# Patient Record
Sex: FEMALE | Race: WHITE | URBAN - METROPOLITAN AREA
[De-identification: names, ages, dates, MRNs, and addresses within clinical notes are randomized per-mention and may not be internally consistent; named-entity substitution may affect disease eponyms.]

---

## 2018-10-24 ENCOUNTER — OFFICE VISIT (OUTPATIENT)
Dept: SURGERY | Age: 33
End: 2018-10-24

## 2018-10-24 VITALS
SYSTOLIC BLOOD PRESSURE: 116 MMHG | DIASTOLIC BLOOD PRESSURE: 74 MMHG | HEIGHT: 63 IN | BODY MASS INDEX: 24.8 KG/M2 | WEIGHT: 140 LBS | HEART RATE: 87 BPM

## 2018-10-24 DIAGNOSIS — N60.11 FIBROCYSTIC BREAST, RIGHT: ICD-10-CM

## 2018-10-24 DIAGNOSIS — N60.01 BREAST CYST, RIGHT: Primary | ICD-10-CM

## 2018-10-24 NOTE — COMMUNICATION BODY
HISTORY OF PRESENT ILLNESS  Ulices Michelle is a 26 y.o. female. HPI   NEW patient referred by Dr. Alon Lewis for RIGHT breast pain and RIGHT breast lumps, noticed about 2 weeks ago. Describes >1 lump palpable in RIGHT lateral breast tissue, with pain 8/10 when palpated, otherwise no pain. Denies any nipple changes or drainage.     Obstetric History     No data available   Obstetric Comments   Menarche 14, LMP 10/16/18, # of children 1, age of 4st delivery 20, Hysterectomy/oophorectomy NO/NO, Breast bx NO, history of breast feeding YES, BCP NO, Hormone therapy NO      Family history:  Maternal aunt diagnosed with breast cancer mid 42's, now in her late 52's with bone mets.     No previous breast imaging. History reviewed. No pertinent past medical history. History reviewed. No pertinent surgical history. Social History     Socioeconomic History    Marital status: SINGLE     Spouse name: Not on file    Number of children: Not on file    Years of education: Not on file    Highest education level: Not on file   Social Needs    Financial resource strain: Not on file    Food insecurity - worry: Not on file    Food insecurity - inability: Not on file    Transportation needs - medical: Not on file   [employer] needs - non-medical: Not on file   Occupational History    Not on file   Tobacco Use    Smoking status: Never Smoker    Smokeless tobacco: Never Used   Substance and Sexual Activity    Alcohol use: Yes     Frequency: Monthly or less    Drug use: No    Sexual activity: Not on file   Other Topics Concern    Not on file   Social History Narrative    Not on file       No current outpatient medications on file prior to visit. No current facility-administered medications on file prior to visit.         Allergies   Allergen Reactions    Other Plant, Animal, Environmental Cough     UR symptoms, mold, seasonal allergy    Pcn [Penicillins] Other (comments)     Childhood reaction, unknown       OB History     Obstetric Comments    Menarche 14, LMP 10/16/18, # of children 1, age of 4st delivery 20, Hysterectomy/oophorectomy NO/NO, Breast bx NO, history of breast feeding YES, BCP NO, Hormone therapy NO        ROS  Constitutional: Negative. HENT: Positive for sinus pain. Eyes: Positive for blurred vision. Occasional blurry vision and dizziness   Respiratory: Negative. Cardiovascular: Negative. Gastrointestinal: Negative. Genitourinary: Negative. Musculoskeletal: Positive for joint pain. Skin: Negative. Neurological: Positive for headaches. Endo/Heme/Allergies: Negative. Psychiatric/Behavioral: The patient is nervous/anxious. Physical Exam   Cardiovascular: Normal rate, regular rhythm and normal heart sounds. Pulmonary/Chest: Breath sounds normal. Right breast exhibits tenderness. Right breast exhibits no inverted nipple, no mass, no nipple discharge and no skin change. Left breast exhibits no inverted nipple, no mass, no nipple discharge, no skin change and no tenderness. Breasts are symmetrical.       Lymphadenopathy:        Right cervical: No superficial cervical, no deep cervical and no posterior cervical adenopathy present. Left cervical: No superficial cervical, no deep cervical and no posterior cervical adenopathy present. She has no axillary adenopathy. Right axillary: No pectoral and no lateral adenopathy present. Left axillary: No pectoral and no lateral adenopathy present. BREAST ULTRASOUND  Indication: right breast pain UOQ  Technique: The area was scanned using a high-frequency linear-array near-field transducer  Findings: Cysts, dense breast tissue  Impression: Benign cysts, normal dense breast tissue  Disposition: Vit E and Primrose oil for pain    ASSESSMENT and PLAN    ICD-10-CM ICD-9-CM    1. Breast cyst, right N60.01 610.0    2.  Fibrocystic breast, right N60.11 610.1       New patient presents for evaluation of RIGHT breast pain. RIGHT breast UOQ tender to palpation on exam. US visualizes cysts and normal dense breast tissue. Pt notes changes to her menstrual periods, possibly due to stress. Reviewed that these changes can often cause breast pain. Advised her to continue observation, and if pain does not resolve, treat with Vitamin E and Primrose oil. If this does not help, f/u for further evaluation. F/U PRN. This plan was reviewed with the patient and patient agrees. All questions were answered.     Written by Antonio Wooten, as dictated by Dr. Wilfrid Palomo MD.

## 2018-10-24 NOTE — PROGRESS NOTES
HISTORY OF PRESENT ILLNESS  Fatou Spivey is a 26 y.o. female. HPI   NEW patient referred by Dr. Gareth Saez for RIGHT breast pain and RIGHT breast lumps, noticed about 2 weeks ago. Describes >1 lump palpable in RIGHT lateral breast tissue, with pain 8/10 when palpated, otherwise no pain. Denies any nipple changes or drainage.     Obstetric History     No data available   Obstetric Comments   Menarche 14, LMP 10/16/18, # of children 1, age of 4st delivery 20, Hysterectomy/oophorectomy NO/NO, Breast bx NO, history of breast feeding YES, BCP NO, Hormone therapy NO      Family history:  Maternal aunt diagnosed with breast cancer mid 42's, now in her late 52's with bone mets.     No previous breast imaging. History reviewed. No pertinent past medical history. History reviewed. No pertinent surgical history. Social History     Socioeconomic History    Marital status: SINGLE     Spouse name: Not on file    Number of children: Not on file    Years of education: Not on file    Highest education level: Not on file   Social Needs    Financial resource strain: Not on file    Food insecurity - worry: Not on file    Food insecurity - inability: Not on file    Transportation needs - medical: Not on file   [employer] needs - non-medical: Not on file   Occupational History    Not on file   Tobacco Use    Smoking status: Never Smoker    Smokeless tobacco: Never Used   Substance and Sexual Activity    Alcohol use: Yes     Frequency: Monthly or less    Drug use: No    Sexual activity: Not on file   Other Topics Concern    Not on file   Social History Narrative    Not on file       No current outpatient medications on file prior to visit. No current facility-administered medications on file prior to visit.         Allergies   Allergen Reactions    Other Plant, Animal, Environmental Cough     UR symptoms, mold, seasonal allergy    Pcn [Penicillins] Other (comments)     Childhood reaction, unknown       OB History     Obstetric Comments    Menarche 14, LMP 10/16/18, # of children 1, age of 4st delivery 20, Hysterectomy/oophorectomy NO/NO, Breast bx NO, history of breast feeding YES, BCP NO, Hormone therapy NO        ROS  Constitutional: Negative. HENT: Positive for sinus pain. Eyes: Positive for blurred vision. Occasional blurry vision and dizziness   Respiratory: Negative. Cardiovascular: Negative. Gastrointestinal: Negative. Genitourinary: Negative. Musculoskeletal: Positive for joint pain. Skin: Negative. Neurological: Positive for headaches. Endo/Heme/Allergies: Negative. Psychiatric/Behavioral: The patient is nervous/anxious. Physical Exam   Cardiovascular: Normal rate, regular rhythm and normal heart sounds. Pulmonary/Chest: Breath sounds normal. Right breast exhibits tenderness. Right breast exhibits no inverted nipple, no mass, no nipple discharge and no skin change. Left breast exhibits no inverted nipple, no mass, no nipple discharge, no skin change and no tenderness. Breasts are symmetrical.       Lymphadenopathy:        Right cervical: No superficial cervical, no deep cervical and no posterior cervical adenopathy present. Left cervical: No superficial cervical, no deep cervical and no posterior cervical adenopathy present. She has no axillary adenopathy. Right axillary: No pectoral and no lateral adenopathy present. Left axillary: No pectoral and no lateral adenopathy present. BREAST ULTRASOUND  Indication: right breast pain UOQ  Technique: The area was scanned using a high-frequency linear-array near-field transducer  Findings: Cysts, dense breast tissue  Impression: Benign cysts, normal dense breast tissue  Disposition: Vit E and Primrose oil for pain    ASSESSMENT and PLAN    ICD-10-CM ICD-9-CM    1. Breast cyst, right N60.01 610.0    2.  Fibrocystic breast, right N60.11 610.1       New patient presents for evaluation of RIGHT breast pain. RIGHT breast UOQ tender to palpation on exam. US visualizes cysts and normal dense breast tissue. Pt notes changes to her menstrual periods, possibly due to stress. Reviewed that these changes can often cause breast pain. Advised her to continue observation, and if pain does not resolve, treat with Vitamin E and Primrose oil. If this does not help, f/u for further evaluation. F/U PRN. This plan was reviewed with the patient and patient agrees. All questions were answered.     Written by Erika Ambrose, as dictated by Dr. Alecia Michele MD.

## 2018-10-24 NOTE — PATIENT INSTRUCTIONS

## 2018-10-24 NOTE — PROGRESS NOTES
HISTORY OF PRESENT ILLNESS Ramya Owens is a 26 y.o. female. HPI  NEW patient referred by Dr. Ana Laura Parker for RIGHT breast pain and RIGHT breast lumps, noticed about 2 weeks ago. Describes >1 lump palpable in RIGHT lateral breast tissue, with pain 8/10 when palpated, otherwise no pain. Denies any nipple changes or drainage. Obstetric History No data available Obstetric Comments Menarche 14, LMP 10/16/18, # of children 1, age of 4st delivery 20, Hysterectomy/oophorectomy NO/NO, Breast bx NO, history of breast feeding YES, BCP NO, Hormone therapy NO Family history: 
Maternal aunt diagnosed with breast cancer mid 42's, now in her late 52's with bone mets. No previous breast imaging. Review of Systems Constitutional: Negative. HENT: Positive for sinus pain. Eyes: Positive for blurred vision. Occasional blurry vision and dizziness Respiratory: Negative. Cardiovascular: Negative. Gastrointestinal: Negative. Genitourinary: Negative. Musculoskeletal: Positive for joint pain. Skin: Negative. Neurological: Positive for headaches. Endo/Heme/Allergies: Negative. Psychiatric/Behavioral: The patient is nervous/anxious. Physical Exam 
 
ASSESSMENT and PLAN 
{ASSESSMENT/PLAN:81903}

## 2018-10-24 NOTE — LETTER
10/24/2018 12:12 PM 
 
Patient:  Lolita Burger YOB: 1985 Date of Visit: 10/24/2018 Dear Rosa Rodriges: 
 
Thank you for referring Ms. Kory Escobar to me for evaluation/treatment. Below are the relevant portions of my assessment and plan of care. HISTORY OF PRESENT ILLNESS Lolita Burger is a 26 y.o. female. HPI  
NEW patient referred by Dr. Ele Walton for RIGHT breast pain and RIGHT breast lumps, noticed about 2 weeks ago. Describes >1 lump palpable in RIGHT lateral breast tissue, with pain 8/10 when palpated, otherwise no pain. Denies any nipple changes or drainage. 
  
Obstetric History No data available Obstetric Comments Menarche 14, LMP 10/16/18, # of children 1, age of 4st delivery 20, Hysterectomy/oophorectomy NO/NO, Breast bx NO, history of breast feeding YES, BCP NO, Hormone therapy NO  
  
Family history: 
Maternal aunt diagnosed with breast cancer mid 42's, now in her late 52's with bone mets. 
  
No previous breast imaging. History reviewed. No pertinent past medical history. History reviewed. No pertinent surgical history. Social History Socioeconomic History  Marital status: SINGLE Spouse name: Not on file  Number of children: Not on file  Years of education: Not on file  Highest education level: Not on file Social Needs  Financial resource strain: Not on file  Food insecurity - worry: Not on file  Food insecurity - inability: Not on file  Transportation needs - medical: Not on file  Transportation needs - non-medical: Not on file Occupational History  Not on file Tobacco Use  Smoking status: Never Smoker  Smokeless tobacco: Never Used Substance and Sexual Activity  Alcohol use: Yes Frequency: Monthly or less  Drug use: No  
 Sexual activity: Not on file Other Topics Concern  Not on file Social History Narrative  Not on file No current outpatient medications on file prior to visit. No current facility-administered medications on file prior to visit. Allergies Allergen Reactions  Other Plant, Animal, Environmental Cough UR symptoms, mold, seasonal allergy  Pcn [Penicillins] Other (comments) Childhood reaction, unknown OB History Obstetric Comments Menarche 14, LMP 10/16/18, # of children 1, age of 4st delivery 20, Hysterectomy/oophorectomy NO/NO, Breast bx NO, history of breast feeding YES, BCP NO, Hormone therapy NO  
  
 
ROS Constitutional: Negative. HENT: Positive for sinus pain. Eyes: Positive for blurred vision. Occasional blurry vision and dizziness Respiratory: Negative. Cardiovascular: Negative. Gastrointestinal: Negative. Genitourinary: Negative. Musculoskeletal: Positive for joint pain. Skin: Negative. Neurological: Positive for headaches. Endo/Heme/Allergies: Negative. Psychiatric/Behavioral: The patient is nervous/anxious. Physical Exam  
Cardiovascular: Normal rate, regular rhythm and normal heart sounds. Pulmonary/Chest: Breath sounds normal. Right breast exhibits tenderness. Right breast exhibits no inverted nipple, no mass, no nipple discharge and no skin change. Left breast exhibits no inverted nipple, no mass, no nipple discharge, no skin change and no tenderness. Breasts are symmetrical.  
 
 
Lymphadenopathy:  
     Right cervical: No superficial cervical, no deep cervical and no posterior cervical adenopathy present. Left cervical: No superficial cervical, no deep cervical and no posterior cervical adenopathy present. She has no axillary adenopathy. Right axillary: No pectoral and no lateral adenopathy present. Left axillary: No pectoral and no lateral adenopathy present. BREAST ULTRASOUND Indication: right breast pain UOQ Technique:   The area was scanned using a high-frequency linear-array near-field transducer Findings: Cysts, dense breast tissue Impression: Benign cysts, normal dense breast tissue Disposition: Vit E and Primrose oil for pain ASSESSMENT and PLAN 
  ICD-10-CM ICD-9-CM 1. Breast cyst, right N60.01 610.0 2. Fibrocystic breast, right N60.11 610.1 New patient presents for evaluation of RIGHT breast pain. RIGHT breast UOQ tender to palpation on exam. US visualizes cysts and normal dense breast tissue. Pt notes changes to her menstrual periods, possibly due to stress. Reviewed that these changes can often cause breast pain. Advised her to continue observation, and if pain does not resolve, treat with Vitamin E and Primrose oil. If this does not help, f/u for further evaluation. F/U PRN. This plan was reviewed with the patient and patient agrees. All questions were answered. Written by Vincenzo Godwin, as dictated by Dr. Freddy Franco MD. 
 
 
If you have questions, please do not hesitate to call me. I look forward to following Ms. Colunga along with you. Sincerely, 
 
Herman Kaye MD

## 2018-11-15 ENCOUNTER — DOCUMENTATION ONLY (OUTPATIENT)
Dept: NEUROLOGY | Age: 33
End: 2018-11-15
